# Patient Record
Sex: FEMALE | Race: WHITE | Employment: FULL TIME | URBAN - METROPOLITAN AREA
[De-identification: names, ages, dates, MRNs, and addresses within clinical notes are randomized per-mention and may not be internally consistent; named-entity substitution may affect disease eponyms.]

---

## 2017-01-10 PROCEDURE — 87081 CULTURE SCREEN ONLY: CPT | Performed by: OBSTETRICS & GYNECOLOGY

## 2017-01-17 PROBLEM — Z34.90 PREGNANCY: Status: ACTIVE | Noted: 2017-01-17

## 2017-01-22 ENCOUNTER — HOSPITAL ENCOUNTER (INPATIENT)
Facility: HOSPITAL | Age: 32
LOS: 2 days | Discharge: HOME | End: 2017-01-24
Attending: OBSTETRICS & GYNECOLOGY | Admitting: OBSTETRICS & GYNECOLOGY
Payer: COMMERCIAL

## 2017-01-22 LAB
ANTIBODY SCREEN: POSITIVE
ERYTHROCYTE [DISTWIDTH] IN BLOOD BY AUTOMATED COUNT: 13.3 % (ref 11.5–16)
HCT VFR BLD AUTO: 33.8 % (ref 34–50)
HGB BLD-MCNC: 11.5 G/DL (ref 12–16)
MCH RBC QN AUTO: 30.3 PG (ref 27–33.2)
MCHC RBC AUTO-ENTMCNC: 34 G/DL (ref 31–37)
MCV RBC AUTO: 89.2 FL (ref 81–100)
PLATELET # BLD AUTO: 166 10(3)UL (ref 150–450)
RBC # BLD AUTO: 3.79 X10(6)UL (ref 3.8–5.1)
RED CELL DISTRIBUTION WIDTH-SD: 43.5 FL (ref 35.1–46.3)
RH BLOOD TYPE: NEGATIVE
T PALLIDUM AB SER QL IA: NONREACTIVE
WBC # BLD AUTO: 15.7 X10(3) UL (ref 4–13)

## 2017-01-22 PROCEDURE — 0KQM0ZZ REPAIR PERINEUM MUSCLE, OPEN APPROACH: ICD-10-PCS | Performed by: OBSTETRICS & GYNECOLOGY

## 2017-01-22 PROCEDURE — 86900 BLOOD TYPING SEROLOGIC ABO: CPT | Performed by: OBSTETRICS & GYNECOLOGY

## 2017-01-22 PROCEDURE — 85027 COMPLETE CBC AUTOMATED: CPT | Performed by: OBSTETRICS & GYNECOLOGY

## 2017-01-22 PROCEDURE — 85461 HEMOGLOBIN FETAL: CPT | Performed by: OBSTETRICS & GYNECOLOGY

## 2017-01-22 PROCEDURE — 86901 BLOOD TYPING SEROLOGIC RH(D): CPT | Performed by: OBSTETRICS & GYNECOLOGY

## 2017-01-22 PROCEDURE — 3E0234Z INTRODUCTION OF SERUM, TOXOID AND VACCINE INTO MUSCLE, PERCUTANEOUS APPROACH: ICD-10-PCS | Performed by: OBSTETRICS & GYNECOLOGY

## 2017-01-22 PROCEDURE — 86850 RBC ANTIBODY SCREEN: CPT | Performed by: OBSTETRICS & GYNECOLOGY

## 2017-01-22 PROCEDURE — 86870 RBC ANTIBODY IDENTIFICATION: CPT | Performed by: OBSTETRICS & GYNECOLOGY

## 2017-01-22 PROCEDURE — 84112 EVAL AMNIOTIC FLUID PROTEIN: CPT

## 2017-01-22 PROCEDURE — 86780 TREPONEMA PALLIDUM: CPT | Performed by: OBSTETRICS & GYNECOLOGY

## 2017-01-22 RX ORDER — TERBUTALINE SULFATE 1 MG/ML
0.25 INJECTION, SOLUTION SUBCUTANEOUS AS NEEDED
Status: DISCONTINUED | OUTPATIENT
Start: 2017-01-22 | End: 2017-01-22

## 2017-01-22 RX ORDER — DOCUSATE SODIUM 100 MG/1
100 CAPSULE, LIQUID FILLED ORAL
Status: DISCONTINUED | OUTPATIENT
Start: 2017-01-22 | End: 2017-01-24

## 2017-01-22 RX ORDER — NALBUPHINE HCL 10 MG/ML
2.5 AMPUL (ML) INJECTION
Status: DISCONTINUED | OUTPATIENT
Start: 2017-01-22 | End: 2017-01-24

## 2017-01-22 RX ORDER — DIAPER,BRIEF,INFANT-TODD,DISP
EACH MISCELLANEOUS EVERY 6 HOURS PRN
Status: DISCONTINUED | OUTPATIENT
Start: 2017-01-22 | End: 2017-01-24

## 2017-01-22 RX ORDER — IBUPROFEN 600 MG/1
600 TABLET ORAL ONCE AS NEEDED
Status: DISCONTINUED | OUTPATIENT
Start: 2017-01-22 | End: 2017-01-22

## 2017-01-22 RX ORDER — DEXTROSE, SODIUM CHLORIDE, SODIUM LACTATE, POTASSIUM CHLORIDE, AND CALCIUM CHLORIDE 5; .6; .31; .03; .02 G/100ML; G/100ML; G/100ML; G/100ML; G/100ML
INJECTION, SOLUTION INTRAVENOUS AS NEEDED
Status: DISCONTINUED | OUTPATIENT
Start: 2017-01-22 | End: 2017-01-22

## 2017-01-22 RX ORDER — BISACODYL 10 MG
10 SUPPOSITORY, RECTAL RECTAL ONCE AS NEEDED
Status: ACTIVE | OUTPATIENT
Start: 2017-01-22 | End: 2017-01-22

## 2017-01-22 RX ORDER — EPHEDRINE SULFATE 50 MG/ML
5 INJECTION, SOLUTION INTRAVENOUS AS NEEDED
Status: DISCONTINUED | OUTPATIENT
Start: 2017-01-22 | End: 2017-01-22

## 2017-01-22 RX ORDER — SIMETHICONE 80 MG
80 TABLET,CHEWABLE ORAL 3 TIMES DAILY PRN
Status: DISCONTINUED | OUTPATIENT
Start: 2017-01-22 | End: 2017-01-24

## 2017-01-22 RX ORDER — HYDROCODONE BITARTRATE AND ACETAMINOPHEN 5; 325 MG/1; MG/1
1 TABLET ORAL EVERY 4 HOURS PRN
Status: DISCONTINUED | OUTPATIENT
Start: 2017-01-22 | End: 2017-01-24

## 2017-01-22 RX ORDER — SODIUM CHLORIDE, SODIUM LACTATE, POTASSIUM CHLORIDE, CALCIUM CHLORIDE 600; 310; 30; 20 MG/100ML; MG/100ML; MG/100ML; MG/100ML
INJECTION, SOLUTION INTRAVENOUS CONTINUOUS
Status: DISCONTINUED | OUTPATIENT
Start: 2017-01-22 | End: 2017-01-22

## 2017-01-22 RX ORDER — HYDROCODONE BITARTRATE AND ACETAMINOPHEN 5; 325 MG/1; MG/1
2 TABLET ORAL EVERY 4 HOURS PRN
Status: DISCONTINUED | OUTPATIENT
Start: 2017-01-22 | End: 2017-01-24

## 2017-01-22 RX ORDER — ACETAMINOPHEN 500 MG
1000 TABLET ORAL EVERY 6 HOURS PRN
Status: DISCONTINUED | OUTPATIENT
Start: 2017-01-22 | End: 2017-01-22

## 2017-01-22 RX ORDER — IBUPROFEN 600 MG/1
600 TABLET ORAL EVERY 6 HOURS
Status: DISCONTINUED | OUTPATIENT
Start: 2017-01-22 | End: 2017-01-24

## 2017-01-22 RX ORDER — ONDANSETRON 2 MG/ML
4 INJECTION INTRAMUSCULAR; INTRAVENOUS EVERY 6 HOURS PRN
Status: DISCONTINUED | OUTPATIENT
Start: 2017-01-22 | End: 2017-01-24

## 2017-01-22 RX ORDER — ACETAMINOPHEN 325 MG/1
650 TABLET ORAL EVERY 4 HOURS PRN
Status: DISCONTINUED | OUTPATIENT
Start: 2017-01-22 | End: 2017-01-24

## 2017-01-22 NOTE — L&D DELIVERY NOTE
OB/GYN: Vaginal Delivery Note       History:   Obstetric History     T0    TAB0  SAB0  E0  M0  L0     Pre-Delivery Diagnosis: 37 6/7 weeks  Post-Delivery Diagnosis: same viable male    Procedure:  with repair 2' perineal laceration  Surgeon Complications:  None   Timed cord clamping:  No   Cord blood disposition:  to lab   Gases sent?:  No             Resuscitation    Method:  None          [place] Measurements    Weight:  8 lb 1.1 oz Length:  50.8 cm   Head circum.:  34.5 cm Chest circum.: without complication. The infant was placed on the mothers chest. The cord was clamped and cut by the FOB. There was spontaneous delivery of an intact 3V cord placenta. . There was good hemostasis with Pitocin added to the IV and uterine massage.  Visual ins

## 2017-01-22 NOTE — H&P
[facility]  History & Physical    SUBJECTIVE:    Guy Baron is a 32year old  at 41w10d who presents with SROM at 0100.     Obstetric History:    OB History    Para Term  AB SAB TAB Ectopic Multiple Living   1 0 0 0 0 0 0 0 0 0

## 2017-01-22 NOTE — PLAN OF CARE
ANXIETY    • Will report anxiety at manageable levels Progressing        BIRTH - VAGINAL/ SECTION    • Fetal and maternal status remain reassuring during the birth process Progressing        PAIN - ADULT    • Verbalizes/displays adequate comfort le

## 2017-01-22 NOTE — PROGRESS NOTES
Patient feeling UC tolerating   good variaiblity  SVE 2+/90/-1  Continue Pitocin augmentation for SROM  FWB reassuring, analgesia as needed

## 2017-01-23 LAB
BASOPHILS # BLD AUTO: 0.05 X10(3) UL (ref 0–0.1)
BASOPHILS NFR BLD AUTO: 0.2 %
EOSINOPHIL # BLD AUTO: 0.14 X10(3) UL (ref 0–0.3)
EOSINOPHIL NFR BLD AUTO: 0.6 %
ERYTHROCYTE [DISTWIDTH] IN BLOOD BY AUTOMATED COUNT: 13.5 % (ref 11.5–16)
FETAL SCREEN RESULT: NEGATIVE
HCT VFR BLD AUTO: 30.1 % (ref 34–50)
HGB BLD-MCNC: 10.1 G/DL (ref 12–16)
IMMATURE GRANULOCYTE COUNT: 0.21 X10(3) UL (ref 0–1)
IMMATURE GRANULOCYTE RATIO %: 0.9 %
LYMPHOCYTES # BLD AUTO: 2.76 X10(3) UL (ref 0.9–4)
LYMPHOCYTES NFR BLD AUTO: 12.1 %
MCH RBC QN AUTO: 30.6 PG (ref 27–33.2)
MCHC RBC AUTO-ENTMCNC: 33.6 G/DL (ref 31–37)
MCV RBC AUTO: 91.2 FL (ref 81–100)
MONOCYTES # BLD AUTO: 1.33 X10(3) UL (ref 0.1–0.6)
MONOCYTES NFR BLD AUTO: 5.8 %
NEUTROPHIL ABS PRELIM: 18.36 X10 (3) UL (ref 1.3–6.7)
NEUTROPHILS # BLD AUTO: 18.36 X10(3) UL (ref 1.3–6.7)
NEUTROPHILS NFR BLD AUTO: 80.4 %
PLATELET # BLD AUTO: 142 10(3)UL (ref 150–450)
RBC # BLD AUTO: 3.3 X10(6)UL (ref 3.8–5.1)
RED CELL DISTRIBUTION WIDTH-SD: 45.1 FL (ref 35.1–46.3)
RH BLOOD TYPE: NEGATIVE
WBC # BLD AUTO: 22.9 X10(3) UL (ref 4–13)

## 2017-01-23 PROCEDURE — 85025 COMPLETE CBC W/AUTO DIFF WBC: CPT | Performed by: OBSTETRICS & GYNECOLOGY

## 2017-01-23 NOTE — PLAN OF CARE
Problem: POSTPARTUM  Goal: Long Term Goal:Experiences normal postpartum course  INTERVENTIONS:  - Assess and monitor vital signs and lab values. - Assess fundus and lochia. - Provide ice/sitz baths for perineum discomfort.   - Monitor healing of incision/ provide assistance with proper latch. - Review techniques for milk expression (breast pumping) and storage of breast milk. Provide pumping equipment/supplies, instructions and assistance, as needed. - Encourage rooming-in and breast feeding on demand.   -

## 2017-01-23 NOTE — PROGRESS NOTES
Patient up to bathroom with assist x 2. Voided 200mL. Patient transferred to mother/baby room 2212 per wheelchair in stable condition with baby and personal belongings. Accompanied by significant other and staff. Report given to mother/baby RN, Shayy Blackwell.

## 2017-01-23 NOTE — PROGRESS NOTES
Danii Richard Patient Status:  Inpatient    1985 MRN [id number]   [facility] 2SW-J Attending Dann Conley MD   Hosp Day # 1 PCP Antonina Ramirez DO     Pt has no complaints  Breast Feeding:  y    Afebrile  VS stable  Abdomen:  Soft NT

## 2017-01-23 NOTE — PROGRESS NOTES
of head per Dr Talmadge Spatz, followed by  of viable male over 2nd degree lac per Dr Talmadge Spatz. Infant to mother's chest. Dried and stimulated. Mouth and nares sxn'd with bulb syringe. Cord clamped per Dr Talmadge Spatz and cut per FOB.  Infant taken to rad

## 2017-01-24 VITALS
HEART RATE: 77 BPM | BODY MASS INDEX: 26.16 KG/M2 | SYSTOLIC BLOOD PRESSURE: 108 MMHG | DIASTOLIC BLOOD PRESSURE: 67 MMHG | HEIGHT: 65 IN | WEIGHT: 157 LBS | OXYGEN SATURATION: 97 % | RESPIRATION RATE: 18 BRPM | TEMPERATURE: 98 F

## 2017-01-24 NOTE — PROGRESS NOTES
Discharge teaching on mom and baby care completed. All questions and concerns addressed. Pt verbalizes good understanding on information given. Anticipating discharge later today.  Enc to go to CT

## 2017-01-24 NOTE — PROGRESS NOTES
Reviewed discharge instructions and resources available after discharge home, handout provided on foods high in iron, questions answered. Verbalized understanding. Pt stated she feels confident caring for self and  at home.   ID bands matched with pt

## 2017-01-24 NOTE — PROGRESS NOTES
Postpartum Day 2    Pt without complaints.     Temp:  [97.5 °F (36.4 °C)] 97.5 °F (36.4 °C)  Pulse:  [77-85] 77  Resp:  [18] 18  BP: (107-108)/(67-68) 108/67 mmHg  abd  soft, NT, ND, fundus firm below umbilicus  perineum NL lochia  extr  trace edema, no [place]

## 2017-01-24 NOTE — PLAN OF CARE
POSTPARTUM    • Long Term Goal:Experiences normal postpartum course Progressing    • Optimize infant feeding at the breast Progressing    • Appropriate maternal -  bonding Progressing        SAFETY ADULT - FALL    • Free from fall injury Progressing

## 2017-01-26 ENCOUNTER — TELEPHONE (OUTPATIENT)
Dept: OBGYN UNIT | Facility: HOSPITAL | Age: 32
End: 2017-01-26

## 2025-07-16 NOTE — PROGRESS NOTES
Pt is a 32year old female admitted to 105/-A. Patient presents with:  Rupture Of Membranes     Pt is  37w6d intra-uterine pregnancy. History obtained, consents signed. Oriented to room, staff, and plan of care. 0 (no pain/absence of nonverbal indicators of pain)

## (undated) NOTE — IP AVS SNAPSHOT
[facility] [place] [address] [place], [address] ~ [phone]                Discharge Summary   1/22/2017    Dragan Beckwith           Admission Information        Provider Department    1/22/2017 Sagar Egan MD  2sw-J      Why you w INFLUENZA 9/26/2016, 9/29/2015, 9/26/2014    RHO(D) Immune Globulin 1/23/2017, 11/16/2016    TDAP 11/22/2016, 9/26/2014, 5/14/2012, 8/15/2011      OB Lab Results  (Last 3 results in the past 270 days)    Blood Type Rh Factor Rubella GBBS    (01/22/17)  A Fetal Monitoring Resolved   Delivery Process Resolved   Tocolytics Resolved   Psychosocial/Spiritual Support Resolved   Community Resources Resolved         Handwashing & Infection Prevention  Resolved   Handwashing and Respiratory Hygiene Resolved discharge instructions in [employer] by going to Visits < Admission Summaries. If you've been to the Emergency Department or your doctor's office, you can view your past visit information in [employer] by going to Visits < Visit Summaries. [employer] questions?